# Patient Record
Sex: MALE | URBAN - METROPOLITAN AREA
[De-identification: names, ages, dates, MRNs, and addresses within clinical notes are randomized per-mention and may not be internally consistent; named-entity substitution may affect disease eponyms.]

---

## 2024-11-14 DIAGNOSIS — G93.89 ENCEPHALOMALACIA: Primary | ICD-10-CM

## 2024-12-28 ENCOUNTER — HOSPITAL ENCOUNTER (EMERGENCY)
Facility: HOSPITAL | Age: 34
Discharge: HOME | End: 2024-12-29
Attending: EMERGENCY MEDICINE
Payer: OTHER GOVERNMENT

## 2024-12-28 DIAGNOSIS — R19.7 DIARRHEA, UNSPECIFIED TYPE: Primary | ICD-10-CM

## 2024-12-28 LAB
ALBUMIN SERPL BCP-MCNC: 3.7 G/DL (ref 3.5–5.2)
ALP SERPL-CCNC: 63 U/L (ref 40–150)
ALT SERPL W/O P-5'-P-CCNC: 13 U/L (ref 10–44)
ANION GAP SERPL CALC-SCNC: 9 MMOL/L (ref 8–16)
AST SERPL-CCNC: 19 U/L (ref 10–40)
BASOPHILS # BLD AUTO: 0.03 K/UL (ref 0–0.2)
BASOPHILS NFR BLD: 0.5 % (ref 0–1.9)
BILIRUB SERPL-MCNC: 0.5 MG/DL (ref 0.1–1)
BILIRUB UR QL STRIP: NEGATIVE
BUN SERPL-MCNC: 11 MG/DL (ref 6–20)
CALCIUM SERPL-MCNC: 8.8 MG/DL (ref 8.7–10.5)
CHLORIDE SERPL-SCNC: 107 MMOL/L (ref 95–110)
CLARITY UR REFRACT.AUTO: CLEAR
CO2 SERPL-SCNC: 20 MMOL/L (ref 23–29)
COLOR UR AUTO: COLORLESS
CREAT SERPL-MCNC: 1 MG/DL (ref 0.5–1.4)
DIFFERENTIAL METHOD BLD: ABNORMAL
EOSINOPHIL # BLD AUTO: 0.3 K/UL (ref 0–0.5)
EOSINOPHIL NFR BLD: 5.1 % (ref 0–8)
ERYTHROCYTE [DISTWIDTH] IN BLOOD BY AUTOMATED COUNT: 12.9 % (ref 11.5–14.5)
EST. GFR  (NO RACE VARIABLE): >60 ML/MIN/1.73 M^2
GLUCOSE SERPL-MCNC: 96 MG/DL (ref 70–110)
GLUCOSE UR QL STRIP: NEGATIVE
HCT VFR BLD AUTO: 43 % (ref 40–54)
HCV AB SERPL QL IA: NORMAL
HGB BLD-MCNC: 15.2 G/DL (ref 14–18)
HGB UR QL STRIP: NEGATIVE
HIV 1+2 AB+HIV1 P24 AG SERPL QL IA: NORMAL
IMM GRANULOCYTES # BLD AUTO: 0.02 K/UL (ref 0–0.04)
IMM GRANULOCYTES NFR BLD AUTO: 0.3 % (ref 0–0.5)
INFLUENZA A, MOLECULAR: NEGATIVE
INFLUENZA B, MOLECULAR: NEGATIVE
KETONES UR QL STRIP: NEGATIVE
LEUKOCYTE ESTERASE UR QL STRIP: NEGATIVE
LYMPHOCYTES # BLD AUTO: 1.5 K/UL (ref 1–4.8)
LYMPHOCYTES NFR BLD: 24.8 % (ref 18–48)
MCH RBC QN AUTO: 32.3 PG (ref 27–31)
MCHC RBC AUTO-ENTMCNC: 35.3 G/DL (ref 32–36)
MCV RBC AUTO: 92 FL (ref 82–98)
MONOCYTES # BLD AUTO: 0.6 K/UL (ref 0.3–1)
MONOCYTES NFR BLD: 10.4 % (ref 4–15)
NEUTROPHILS # BLD AUTO: 3.6 K/UL (ref 1.8–7.7)
NEUTROPHILS NFR BLD: 58.9 % (ref 38–73)
NITRITE UR QL STRIP: NEGATIVE
NRBC BLD-RTO: 0 /100 WBC
PH UR STRIP: 6 [PH] (ref 5–8)
PLATELET # BLD AUTO: 213 K/UL (ref 150–450)
PMV BLD AUTO: 10.6 FL (ref 9.2–12.9)
POTASSIUM SERPL-SCNC: 3.9 MMOL/L (ref 3.5–5.1)
PROT SERPL-MCNC: 7.2 G/DL (ref 6–8.4)
PROT UR QL STRIP: NEGATIVE
RBC # BLD AUTO: 4.7 M/UL (ref 4.6–6.2)
SODIUM SERPL-SCNC: 136 MMOL/L (ref 136–145)
SP GR UR STRIP: 1 (ref 1–1.03)
SPECIMEN SOURCE: NORMAL
URN SPEC COLLECT METH UR: ABNORMAL
WBC # BLD AUTO: 6.05 K/UL (ref 3.9–12.7)

## 2024-12-28 PROCEDURE — 86803 HEPATITIS C AB TEST: CPT | Performed by: PHYSICIAN ASSISTANT

## 2024-12-28 PROCEDURE — 96360 HYDRATION IV INFUSION INIT: CPT

## 2024-12-28 PROCEDURE — 25000003 PHARM REV CODE 250: Performed by: PHYSICIAN ASSISTANT

## 2024-12-28 PROCEDURE — 87502 INFLUENZA DNA AMP PROBE: CPT | Performed by: PHYSICIAN ASSISTANT

## 2024-12-28 PROCEDURE — 80053 COMPREHEN METABOLIC PANEL: CPT | Performed by: EMERGENCY MEDICINE

## 2024-12-28 PROCEDURE — 81003 URINALYSIS AUTO W/O SCOPE: CPT | Performed by: EMERGENCY MEDICINE

## 2024-12-28 PROCEDURE — 99285 EMERGENCY DEPT VISIT HI MDM: CPT | Mod: 25

## 2024-12-28 PROCEDURE — 25500020 PHARM REV CODE 255: Performed by: EMERGENCY MEDICINE

## 2024-12-28 PROCEDURE — 87389 HIV-1 AG W/HIV-1&-2 AB AG IA: CPT | Performed by: PHYSICIAN ASSISTANT

## 2024-12-28 PROCEDURE — 96372 THER/PROPH/DIAG INJ SC/IM: CPT | Performed by: PHYSICIAN ASSISTANT

## 2024-12-28 PROCEDURE — 63600175 PHARM REV CODE 636 W HCPCS: Performed by: PHYSICIAN ASSISTANT

## 2024-12-28 PROCEDURE — 85025 COMPLETE CBC W/AUTO DIFF WBC: CPT | Performed by: EMERGENCY MEDICINE

## 2024-12-28 RX ORDER — ACETAMINOPHEN 500 MG
1000 TABLET ORAL
Status: COMPLETED | OUTPATIENT
Start: 2024-12-28 | End: 2024-12-28

## 2024-12-28 RX ORDER — DICYCLOMINE HYDROCHLORIDE 10 MG/ML
20 INJECTION INTRAMUSCULAR
Status: COMPLETED | OUTPATIENT
Start: 2024-12-28 | End: 2024-12-28

## 2024-12-28 RX ADMIN — DICYCLOMINE HYDROCHLORIDE 20 MG: 10 INJECTION, SOLUTION INTRAMUSCULAR at 09:12

## 2024-12-28 RX ADMIN — SODIUM CHLORIDE, POTASSIUM CHLORIDE, SODIUM LACTATE AND CALCIUM CHLORIDE 1000 ML: 600; 310; 30; 20 INJECTION, SOLUTION INTRAVENOUS at 09:12

## 2024-12-28 RX ADMIN — ACETAMINOPHEN 1000 MG: 500 TABLET ORAL at 09:12

## 2024-12-28 RX ADMIN — IOHEXOL 75 ML: 350 INJECTION, SOLUTION INTRAVENOUS at 10:12

## 2024-12-29 VITALS
DIASTOLIC BLOOD PRESSURE: 71 MMHG | OXYGEN SATURATION: 100 % | RESPIRATION RATE: 16 BRPM | TEMPERATURE: 98 F | WEIGHT: 160.06 LBS | SYSTOLIC BLOOD PRESSURE: 123 MMHG | HEIGHT: 70 IN | HEART RATE: 57 BPM | BODY MASS INDEX: 22.91 KG/M2

## 2024-12-29 RX ORDER — DICYCLOMINE HYDROCHLORIDE 20 MG/1
20 TABLET ORAL 2 TIMES DAILY
Qty: 60 TABLET | Refills: 0 | Status: SHIPPED | OUTPATIENT
Start: 2024-12-29 | End: 2025-01-28

## 2024-12-29 NOTE — ED PROVIDER NOTES
Encounter Date: 12/28/2024       History     Chief Complaint   Patient presents with    Diarrhea     Pt c/o of diarrhea since Monday, unable to keep anything down, pt has lost 3lbs in the last 3 days. [facility].      34-year-old male with past medical history of Meniere's, migraines who presents ED with diarrhea.  Since Monday has been having severe diarrhea initially had a small amount of blood which resolved.  States he is having more than 10-12 episodes per day.  Reports generalized abdominal cramping.  He is able to tolerate p.o..  No fevers/chills.  [facility].  Denies any foreign travel or suspicious foods.  He did complete a course of amoxicillin a month ago for sinus infection.        Review of patient's allergies indicates:  No Known Allergies  History reviewed. No pertinent past medical history.  History reviewed. No pertinent surgical history.  No family history on file.  Social History     Tobacco Use    Smoking status: Unknown     Review of Systems    Physical Exam     Initial Vitals [12/28/24 1914]   BP Pulse Resp Temp SpO2   135/85 74 16 98.8 °F (37.1 °C) 99 %      MAP       --         Physical Exam    Nursing note and vitals reviewed.  Constitutional: He appears well-developed and well-nourished.   HENT:   Head: Normocephalic and atraumatic.   Eyes: Conjunctivae are normal.   Neck: Neck supple.   Normal range of motion.  Cardiovascular:  Normal rate.           Pulmonary/Chest: Breath sounds normal.   Abdominal: Abdomen is soft. There is no abdominal tenderness.   Musculoskeletal:         General: Normal range of motion.      Cervical back: Normal range of motion and neck supple.     Neurological: He is alert and oriented to person, place, and time. GCS score is 15. GCS eye subscore is 4. GCS verbal subscore is 5. GCS motor subscore is 6.   Skin: Skin is warm and dry.         ED Course   Procedures  Labs Reviewed   CBC W/  AUTO DIFFERENTIAL - Abnormal       Result Value    WBC 6.05      RBC 4.70      Hemoglobin 15.2      Hematocrit 43.0      MCV 92      MCH 32.3 (*)     MCHC 35.3      RDW 12.9      Platelets 213      MPV 10.6      Immature Granulocytes 0.3      Gran # (ANC) 3.6      Immature Grans (Abs) 0.02      Lymph # 1.5      Mono # 0.6      Eos # 0.3      Baso # 0.03      nRBC 0      Gran % 58.9      Lymph % 24.8      Mono % 10.4      Eosinophil % 5.1      Basophil % 0.5      Differential Method Automated     COMPREHENSIVE METABOLIC PANEL - Abnormal    Sodium 136      Potassium 3.9      Chloride 107      CO2 20 (*)     Glucose 96      BUN 11      Creatinine 1.0      Calcium 8.8      Total Protein 7.2      Albumin 3.7      Total Bilirubin 0.5      Alkaline Phosphatase 63      AST 19      ALT 13      eGFR >60.0      Anion Gap 9     URINALYSIS, REFLEX TO URINE CULTURE - Abnormal    Specimen UA Urine, Clean Catch      Color, UA Colorless (*)     Appearance, UA Clear      pH, UA 6.0      Specific Gravity, UA 1.005      Protein, UA Negative      Glucose, UA Negative      Ketones, UA Negative      Bilirubin (UA) Negative      Occult Blood UA Negative      Nitrite, UA Negative      Leukocytes, UA Negative      Narrative:     In and Out Cath as needed it patient unable to void  Specimen Source->Urine   INFLUENZA A & B BY MOLECULAR    Influenza A, Molecular Negative      Influenza B, Molecular Negative      Flu A & B Source NP     HEPATITIS C ANTIBODY    Hepatitis C Ab Non-reactive      Narrative:     Release to patient->Immediate   HIV 1 / 2 ANTIBODY    HIV 1/2 Ag/Ab Non-reactive      Narrative:     Release to patient->Immediate          Imaging Results              CT Abdomen Pelvis With IV Contrast NO Oral Contrast (Final result)  Result time 12/28/24 23:57:17      Final result by Ayana Melendrez MD (12/28/24 23:57:17)                   Impression:      1. Few minimally prominent fluid and air-filled loops of small bowel are noted  which can be seen with developing enteritis in the appropriate clinical setting.  Clinical correlation advised.  2. Possible biliary sludge.  3. Additional findings as above.      Electronically signed by: Ayana Melendrez MD  Date:    12/28/2024  Time:    23:57               Narrative:    EXAMINATION:  CT ABDOMEN PELVIS WITH IV CONTRAST    CLINICAL HISTORY:  Abdominal pain, acute, nonlocalized;    TECHNIQUE:  Low dose axial images, sagittal and coronal reformations were obtained from the lung bases to the pubic symphysis following the IV administration of 75 mL of Omnipaque 350 .  No oral contrast.    COMPARISON:  None.    FINDINGS:  The visualized lung bases are free of pleural fluid or focal consolidation.  The visualized portions of the heart and pericardium are within normal limits.    The liver demonstrates no focal abnormality.  The main portal vein and splenic vein appear patent.  Possible biliary sludge in the gallbladder lumen without definite calcified stone.  No significant intra or extrahepatic biliary ductal dilatation.  The spleen, pancreas and adrenal glands are unremarkable.    The kidneys are normal in size and location and enhance symmetrically.  No evidence of hydronephrosis urinary bladder is distended with smooth margins.  Prostate is within normal limits.    The abdominal aorta is nonaneurysmal.  Shotty periaortic lymph nodes are present.  No retroperitoneal fluid/hemorrhage.    There are a few minimally prominent fluid-filled loops of small bowel which can be seen with enteritis in the appropriate clinical setting.  Visualized loops of large bowel demonstrate no evidence of obstruction or inflammatory change.  The appendix is unremarkable.  No free intraperitoneal air, portal venous gas or ascites.    The visualized osseous structures are intact.  Small fat containing umbilical hernia noted.                                       Medications   lactated ringers bolus 1,000 mL (0 mLs Intravenous  Stopped 12/28/24 2245)   acetaminophen tablet 1,000 mg (1,000 mg Oral Given 12/28/24 2132)   dicyclomine injection 20 mg (20 mg Intramuscular Given 12/28/24 2132)   iohexoL (OMNIPAQUE 350) injection 75 mL (75 mLs Intravenous Given 12/28/24 2258)     Medical Decision Making  34-year-old male presents ED for diarrhea and abdominal cramping.    Differential includes but not limited to C diff, invasive diarrhea, infectious diarrhea, VIKRAM, electrolyte derangement, dehydration     [facility]  Is generalized tenderness but overall benign abdominal exam.  CT abdomen pelvis shows enteritis.  His lab work was reassuring.  Was unable to provide a stool culture sample.  He does have pending stool cultures from his previous [facility]. he is tolerating p.o. without difficulty.  His vital signs are reassuring.  He is stable for discharge home recommend follow-up with his GI doctor.  Will Rx a course of Bentyl for abdominal cramping.  Return ED precautions given.    Amount and/or Complexity of Data Reviewed  Labs: ordered.  Radiology: ordered.    Risk  OTC drugs.  Prescription drug management.                                      Clinical Impression:  Final diagnoses:  [R19.7] Diarrhea, unspecified type (Primary)          ED Disposition Condition    Discharge Stable          ED Prescriptions       Medication Sig Dispense Start Date End Date Auth. Provider    dicyclomine (BENTYL) 20 mg tablet Take 1 tablet (20 mg total) by mouth 2 (two) times daily. 60 tablet 12/29/2024 1/28/2025 Mabel Oviedo PA-C          Follow-up Information       Follow up With Specialties Details Why Contact Info Additional Information    Titi Dietrich MD Family Medicine Schedule an appointment as soon as possible for a visit   [address] [facility] [address]  [phone]       [facility] [address] Gastroenterology Schedule an appointment as soon as possible for a visit   [address]  [facility] [id number] [phone] [facility] - Main Building, 4th Floor Please park in [facility] and take Atrium elevator             Mabel Oviedo PA-C  12/29/24 0008       Mabel Oviedo PA-C  12/29/24 0009

## 2024-12-29 NOTE — ED TRIAGE NOTES
Jacobo Johnson Leonard, a 34 y.o. male presents to the ED w/ complaint of diarrhea x3 days. Pt states he has not been able to keep anything down and has lost 7lbs. Pt states he had a CT done last night and revealed inflammation of stomach and rectum. Denies any body aches, chills, or fevers.

## 2024-12-29 NOTE — DISCHARGE INSTRUCTIONS
Your lab work today was reassuring.  Your CT scan showed signs of enteritis.  Please follow-up with your GI doctor if you continue to have symptoms.  I have prescribed you a medication to help with abdominal cramping.  Please stay hydrated.  You may return to ED sooner if you develop any new or worsening symptoms.